# Patient Record
Sex: FEMALE | NOT HISPANIC OR LATINO | URBAN - METROPOLITAN AREA
[De-identification: names, ages, dates, MRNs, and addresses within clinical notes are randomized per-mention and may not be internally consistent; named-entity substitution may affect disease eponyms.]

---

## 2018-05-10 ENCOUNTER — APPOINTMENT (RX ONLY)
Dept: URBAN - METROPOLITAN AREA OTHER 10 | Facility: OTHER | Age: 48
Setting detail: DERMATOLOGY
End: 2018-05-10

## 2018-05-10 DIAGNOSIS — L71.0 PERIORAL DERMATITIS: ICD-10-CM

## 2018-05-10 PROBLEM — I10 ESSENTIAL (PRIMARY) HYPERTENSION: Status: ACTIVE | Noted: 2018-05-10

## 2018-05-10 PROCEDURE — ? PRESCRIPTION

## 2018-05-10 PROCEDURE — 99202 OFFICE O/P NEW SF 15 MIN: CPT

## 2018-05-10 PROCEDURE — ? TREATMENT REGIMEN

## 2018-05-10 PROCEDURE — ? COUNSELING

## 2018-05-10 RX ORDER — DOXYCYCLINE HYCLATE 100 MG/1
CAPSULE, GELATIN COATED ORAL
Qty: 60 | Refills: 0 | Status: ERX | COMMUNITY
Start: 2018-05-10

## 2018-05-10 RX ORDER — PIMECROLIMUS 10 MG/G
CREAM TOPICAL BID
Qty: 100 | Refills: 0 | Status: ERX | COMMUNITY
Start: 2018-05-10

## 2018-05-10 RX ADMIN — DOXYCYCLINE HYCLATE: 100 CAPSULE, GELATIN COATED ORAL at 00:00

## 2018-05-10 RX ADMIN — PIMECROLIMUS: 10 CREAM TOPICAL at 00:00

## 2018-05-10 NOTE — PROCEDURE: TREATMENT REGIMEN
Detail Level: Zone
Initiate Treatment: Doxycycline twice daily x 1 month.  Elidel twice daily
Samples Given: Clindagel to use once daily

## 2018-05-10 NOTE — HPI: RASH
How Severe Is Your Rash?: severe
Is This A New Presentation, Or A Follow-Up?: Rash
Additional History: \\nNew patient

## 2018-06-12 ENCOUNTER — APPOINTMENT (RX ONLY)
Dept: URBAN - METROPOLITAN AREA OTHER 10 | Facility: OTHER | Age: 48
Setting detail: DERMATOLOGY
End: 2018-06-12

## 2018-06-12 DIAGNOSIS — D22 MELANOCYTIC NEVI: ICD-10-CM

## 2018-06-12 DIAGNOSIS — L71.0 PERIORAL DERMATITIS: ICD-10-CM

## 2018-06-12 DIAGNOSIS — D18.0 HEMANGIOMA: ICD-10-CM

## 2018-06-12 DIAGNOSIS — L81.4 OTHER MELANIN HYPERPIGMENTATION: ICD-10-CM

## 2018-06-12 DIAGNOSIS — L82.1 OTHER SEBORRHEIC KERATOSIS: ICD-10-CM

## 2018-06-12 PROBLEM — D23.61 OTHER BENIGN NEOPLASM OF SKIN OF RIGHT UPPER LIMB, INCLUDING SHOULDER: Status: ACTIVE | Noted: 2018-06-12

## 2018-06-12 PROBLEM — D18.01 HEMANGIOMA OF SKIN AND SUBCUTANEOUS TISSUE: Status: ACTIVE | Noted: 2018-06-12

## 2018-06-12 PROBLEM — D23.5 OTHER BENIGN NEOPLASM OF SKIN OF TRUNK: Status: ACTIVE | Noted: 2018-06-12

## 2018-06-12 PROBLEM — D23.72 OTHER BENIGN NEOPLASM OF SKIN OF LEFT LOWER LIMB, INCLUDING HIP: Status: ACTIVE | Noted: 2018-06-12

## 2018-06-12 PROBLEM — D22.5 MELANOCYTIC NEVI OF TRUNK: Status: ACTIVE | Noted: 2018-06-12

## 2018-06-12 PROCEDURE — ? COUNSELING

## 2018-06-12 PROCEDURE — ? TREATMENT REGIMEN

## 2018-06-12 PROCEDURE — 99214 OFFICE O/P EST MOD 30 MIN: CPT

## 2018-06-12 ASSESSMENT — LOCATION DETAILED DESCRIPTION DERM
LOCATION DETAILED: LEFT RIB CAGE
LOCATION DETAILED: LEFT INFERIOR UPPER BACK
LOCATION DETAILED: LEFT MID-UPPER BACK

## 2018-06-12 ASSESSMENT — LOCATION ZONE DERM: LOCATION ZONE: TRUNK

## 2018-06-12 ASSESSMENT — LOCATION SIMPLE DESCRIPTION DERM
LOCATION SIMPLE: LEFT UPPER BACK
LOCATION SIMPLE: ABDOMEN

## 2020-06-17 ENCOUNTER — OFFICE VISIT (OUTPATIENT)
Dept: URBAN - METROPOLITAN AREA CLINIC 74 | Facility: CLINIC | Age: 50
End: 2020-06-17

## 2021-03-30 ENCOUNTER — TELEPHONE ENCOUNTER (OUTPATIENT)
Dept: URBAN - METROPOLITAN AREA CLINIC 23 | Facility: CLINIC | Age: 51
End: 2021-03-30

## 2023-04-03 ENCOUNTER — APPOINTMENT (RX ONLY)
Dept: URBAN - METROPOLITAN AREA OTHER 10 | Facility: OTHER | Age: 53
Setting detail: DERMATOLOGY
End: 2023-04-03

## 2023-04-03 DIAGNOSIS — L82.1 OTHER SEBORRHEIC KERATOSIS: ICD-10-CM

## 2023-04-03 DIAGNOSIS — D485 NEOPLASM OF UNCERTAIN BEHAVIOR OF SKIN: ICD-10-CM

## 2023-04-03 DIAGNOSIS — L81.4 OTHER MELANIN HYPERPIGMENTATION: ICD-10-CM

## 2023-04-03 DIAGNOSIS — L57.0 ACTINIC KERATOSIS: ICD-10-CM

## 2023-04-03 DIAGNOSIS — D18.0 HEMANGIOMA: ICD-10-CM

## 2023-04-03 DIAGNOSIS — D22 MELANOCYTIC NEVI: ICD-10-CM

## 2023-04-03 PROBLEM — D22.71 MELANOCYTIC NEVI OF RIGHT LOWER LIMB, INCLUDING HIP: Status: ACTIVE | Noted: 2023-04-03

## 2023-04-03 PROBLEM — D18.01 HEMANGIOMA OF SKIN AND SUBCUTANEOUS TISSUE: Status: ACTIVE | Noted: 2023-04-03

## 2023-04-03 PROBLEM — D22.62 MELANOCYTIC NEVI OF LEFT UPPER LIMB, INCLUDING SHOULDER: Status: ACTIVE | Noted: 2023-04-03

## 2023-04-03 PROBLEM — D22.72 MELANOCYTIC NEVI OF LEFT LOWER LIMB, INCLUDING HIP: Status: ACTIVE | Noted: 2023-04-03

## 2023-04-03 PROBLEM — D22.61 MELANOCYTIC NEVI OF RIGHT UPPER LIMB, INCLUDING SHOULDER: Status: ACTIVE | Noted: 2023-04-03

## 2023-04-03 PROBLEM — D48.5 NEOPLASM OF UNCERTAIN BEHAVIOR OF SKIN: Status: ACTIVE | Noted: 2023-04-03

## 2023-04-03 PROBLEM — D22.5 MELANOCYTIC NEVI OF TRUNK: Status: ACTIVE | Noted: 2023-04-03

## 2023-04-03 PROBLEM — D23.72 OTHER BENIGN NEOPLASM OF SKIN OF LEFT LOWER LIMB, INCLUDING HIP: Status: ACTIVE | Noted: 2023-04-03

## 2023-04-03 PROCEDURE — ? LIQUID NITROGEN

## 2023-04-03 PROCEDURE — 99213 OFFICE O/P EST LOW 20 MIN: CPT | Mod: 25

## 2023-04-03 PROCEDURE — ? COUNSELING

## 2023-04-03 PROCEDURE — 17000 DESTRUCT PREMALG LESION: CPT | Mod: 59

## 2023-04-03 PROCEDURE — 11102 TANGNTL BX SKIN SINGLE LES: CPT

## 2023-04-03 PROCEDURE — ? SUNSCREEN RECOMMENDATIONS

## 2023-04-03 PROCEDURE — ? BIOPSY BY SHAVE METHOD

## 2023-04-03 ASSESSMENT — LOCATION ZONE DERM
LOCATION ZONE: FACE
LOCATION ZONE: ARM
LOCATION ZONE: LEG
LOCATION ZONE: TRUNK

## 2023-04-03 ASSESSMENT — LOCATION DETAILED DESCRIPTION DERM
LOCATION DETAILED: LEFT VENTRAL PROXIMAL FOREARM
LOCATION DETAILED: RIGHT ANTERIOR DISTAL THIGH
LOCATION DETAILED: RIGHT CENTRAL MALAR CHEEK
LOCATION DETAILED: LEFT PROXIMAL POSTERIOR THIGH
LOCATION DETAILED: LEFT ANTERIOR PROXIMAL UPPER ARM
LOCATION DETAILED: LEFT DISTAL DORSAL FOREARM
LOCATION DETAILED: LEFT INFERIOR CENTRAL MALAR CHEEK
LOCATION DETAILED: LEFT DISTAL POSTERIOR THIGH
LOCATION DETAILED: RIGHT DISTAL POSTERIOR THIGH
LOCATION DETAILED: RIGHT PROXIMAL DORSAL FOREARM
LOCATION DETAILED: RIGHT VENTRAL DISTAL FOREARM
LOCATION DETAILED: RIGHT INFERIOR UPPER BACK
LOCATION DETAILED: LEFT ANTERIOR PROXIMAL THIGH
LOCATION DETAILED: EPIGASTRIC SKIN
LOCATION DETAILED: RIGHT VENTRAL PROXIMAL FOREARM
LOCATION DETAILED: SUPERIOR LUMBAR SPINE

## 2023-04-03 ASSESSMENT — LOCATION SIMPLE DESCRIPTION DERM
LOCATION SIMPLE: LEFT UPPER ARM
LOCATION SIMPLE: LEFT POSTERIOR THIGH
LOCATION SIMPLE: ABDOMEN
LOCATION SIMPLE: LEFT CHEEK
LOCATION SIMPLE: RIGHT POSTERIOR THIGH
LOCATION SIMPLE: LEFT THIGH
LOCATION SIMPLE: RIGHT UPPER BACK
LOCATION SIMPLE: RIGHT THIGH
LOCATION SIMPLE: RIGHT FOREARM
LOCATION SIMPLE: LEFT FOREARM
LOCATION SIMPLE: LOWER BACK
LOCATION SIMPLE: RIGHT CHEEK

## 2023-04-03 NOTE — PROCEDURE: LIQUID NITROGEN
Detail Level: Detailed
Render Post-Care Instructions In Note?: no
Show Aperture Variable?: Yes
Consent: The patient's consent was obtained including but not limited to risks of redness, swelling, blistering, crusting, scabbing, scarring, darker or lighter pigment change, incomplete removal, recurrence, and infection.
Post-Care Instructions: Patient instructed to keep the area clean with soap and water or Hibiclens.  Avoid picking at any of the treated lesions. Pt may soak any crusted or scabbed areas with hydrogen peroxide and cover with Vaseline and a bandage if they would like.
Number Of Freeze-Thaw Cycles: 1 freeze-thaw cycle
Duration Of Freeze Thaw-Cycle (Seconds): 5

## 2023-04-03 NOTE — PROCEDURE: BIOPSY BY SHAVE METHOD
Detail Level: Detailed
Depth Of Biopsy: dermis
Was A Bandage Applied: Yes
Size Of Lesion In Cm: 0
Biopsy Type: H and E
Biopsy Method: Dermablade
Anesthesia Type: 1% lidocaine with epinephrine
Anesthesia Volume In Cc: 0.3
Hemostasis: Aluminum Chloride and Electrocautery
Wound Care: Vaseline
Dressing: Band-Aid
Destruction After The Procedure: No
Type Of Destruction Used: Electrodesiccation
Cryotherapy Text: The wound bed was treated with cryotherapy after the biopsy was performed.
Electrodesiccation Text: The wound bed was treated with electrodesiccation after the biopsy was performed.
Electrodesiccation And Curettage Text: The wound bed was treated with electrodesiccation and curettage after the biopsy was performed.
Silver Nitrate Text: The wound bed was treated with silver nitrate after the biopsy was performed.
Lab: [id number]
Lab Facility: [id number]
Consent: The risks and benefits of therapy were discussed, including incomplete removal, recurrence, bleeding, infection, scarring, discomfort, and prolonged wound healing.
Post-Care Instructions: I reviewed post-care instructions with the patient. Patient is to leave our bandage on overnight. Tomorrow they may clean the area gently with soap and water or Hibiclens, then apply Vaseline ointment once or twice daily, changing out the bandage daily until the skin has completely healed over. Patient may apply hydrogen peroxide soaks to remove any crusting. Advised patient that keeping the area clean, moist and covered will minimize the risk of infection, crusting, scabbing and scarring. Minimal redness around the area is normal. Instructed to contact the office immediately should they develop significant bleeding, redness, tenderness, purulent drainage, fevers, or chills.
Notification Instructions: Patient will be notified of biopsy results. However, patient instructed to call the office if not contacted within 2 weeks.
Billing Type: Third-Party Bill
Information: Selecting Yes will display possible errors in your note based on the variables you have selected. This validation is only offered as a suggestion for you. PLEASE NOTE THAT THE VALIDATION TEXT WILL BE REMOVED WHEN YOU FINALIZE YOUR NOTE. IF YOU WANT TO FAX A PRELIMINARY NOTE YOU WILL NEED TO TOGGLE THIS TO 'NO' IF YOU DO NOT WANT IT IN YOUR FAXED NOTE.

## 2023-10-03 ENCOUNTER — APPOINTMENT (RX ONLY)
Dept: URBAN - METROPOLITAN AREA OTHER 10 | Facility: OTHER | Age: 53
Setting detail: DERMATOLOGY
End: 2023-10-03

## 2023-10-03 DIAGNOSIS — L90.5 SCAR CONDITIONS AND FIBROSIS OF SKIN: ICD-10-CM

## 2023-10-03 PROCEDURE — 99212 OFFICE O/P EST SF 10 MIN: CPT

## 2023-10-03 PROCEDURE — ? COUNSELING

## 2023-10-03 ASSESSMENT — LOCATION ZONE DERM: LOCATION ZONE: FACE

## 2023-10-03 ASSESSMENT — LOCATION DETAILED DESCRIPTION DERM: LOCATION DETAILED: LEFT LATERAL BUCCAL CHEEK

## 2023-10-03 ASSESSMENT — LOCATION SIMPLE DESCRIPTION DERM: LOCATION SIMPLE: LEFT CHEEK

## 2024-07-26 ENCOUNTER — DASHBOARD ENCOUNTERS (OUTPATIENT)
Age: 54
End: 2024-07-26

## 2024-07-29 ENCOUNTER — OFFICE VISIT (OUTPATIENT)
Dept: URBAN - METROPOLITAN AREA CLINIC 74 | Facility: CLINIC | Age: 54
End: 2024-07-29

## 2024-07-31 ENCOUNTER — OFFICE VISIT (OUTPATIENT)
Dept: URBAN - METROPOLITAN AREA CLINIC 74 | Facility: CLINIC | Age: 54
End: 2024-07-31
Payer: COMMERCIAL

## 2024-07-31 ENCOUNTER — LAB OUTSIDE AN ENCOUNTER (OUTPATIENT)
Dept: URBAN - METROPOLITAN AREA CLINIC 74 | Facility: CLINIC | Age: 54
End: 2024-07-31

## 2024-07-31 VITALS
DIASTOLIC BLOOD PRESSURE: 60 MMHG | WEIGHT: 126 LBS | SYSTOLIC BLOOD PRESSURE: 110 MMHG | BODY MASS INDEX: 23.19 KG/M2 | HEART RATE: 62 BPM | HEIGHT: 62 IN | OXYGEN SATURATION: 97 % | TEMPERATURE: 98.4 F

## 2024-07-31 DIAGNOSIS — K57.30 SIGMOID DIVERTICULOSIS: ICD-10-CM

## 2024-07-31 DIAGNOSIS — Z98.84 S/P GASTRIC BYPASS: ICD-10-CM

## 2024-07-31 DIAGNOSIS — K76.0 FATTY LIVER: ICD-10-CM

## 2024-07-31 DIAGNOSIS — K31.9 GASTROPATHY: ICD-10-CM

## 2024-07-31 DIAGNOSIS — R74.01 ELEVATED TRANSAMINASE LEVEL: ICD-10-CM

## 2024-07-31 DIAGNOSIS — K44.9 HIATAL HERNIA: ICD-10-CM

## 2024-07-31 DIAGNOSIS — Z86.39 HISTORY OF MORBID OBESITY: ICD-10-CM

## 2024-07-31 DIAGNOSIS — K62.1 HYPERPLASTIC RECTAL POLYP: ICD-10-CM

## 2024-07-31 DIAGNOSIS — K21.0 GASTROESOPHAGEAL REFLUX DISEASE WITH ESOPHAGITIS: ICD-10-CM

## 2024-07-31 DIAGNOSIS — K82.4 GALLBLADDER POLYP: ICD-10-CM

## 2024-07-31 PROBLEM — 161453001: Status: ACTIVE | Noted: 2024-07-31

## 2024-07-31 PROBLEM — 698450007: Status: ACTIVE | Noted: 2024-07-31

## 2024-07-31 PROBLEM — 365767001: Status: ACTIVE | Noted: 2024-07-31

## 2024-07-31 PROBLEM — 197433003: Status: ACTIVE | Noted: 2024-07-31

## 2024-07-31 PROCEDURE — 99214 OFFICE O/P EST MOD 30 MIN: CPT | Performed by: INTERNAL MEDICINE

## 2024-07-31 RX ORDER — SIMVASTATIN 40 MG/1
TAKE 1 TABLET BY MOUTH EVERYDAY AT BEDTIME TABLET, FILM COATED ORAL
Qty: 90 EACH | Refills: 0 | Status: ACTIVE | COMMUNITY

## 2024-07-31 RX ORDER — BISOPROLOL FUMARATE 5 MG/1
TABLET, FILM COATED ORAL
Qty: 90 TABLET | Status: ACTIVE | COMMUNITY

## 2024-07-31 RX ORDER — FLUOXETINE HYDROCHLORIDE 40 MG/1
TAKE 1 CAPSULE BY MOUTH EVERY DAY FOR 90 DAYS CAPSULE ORAL
Qty: 90 EACH | Refills: 0 | Status: ACTIVE | COMMUNITY

## 2024-07-31 RX ORDER — LINACLOTIDE 72 UG/1
CAPSULE, GELATIN COATED ORAL
Qty: 30 CAPSULE | Status: ACTIVE | COMMUNITY

## 2024-07-31 RX ORDER — MONTELUKAST SODIUM 10 MG/1
TAKE 1 TABLET BY MOUTH EVERY DAY IN THE EVENING FOR 90 DAYS TABLET, FILM COATED ORAL
Qty: 90 EACH | Refills: 0 | Status: ACTIVE | COMMUNITY

## 2024-07-31 RX ORDER — ESZOPICLONE 2 MG/1
TAKE 1 TABLET BY MOUTH EVERY DAY IMMEDIATELY BEFORE BEDTIME TABLET, FILM COATED ORAL
Qty: 90 EACH | Refills: 0 | Status: ACTIVE | COMMUNITY

## 2024-07-31 RX ORDER — TRAZODONE HYDROCHLORIDE 100 MG/1
TAKE 1 TABLET BY MOUTH EVERY NIGHT AT BEDTIME TABLET ORAL
Qty: 90 EACH | Refills: 0 | Status: ACTIVE | COMMUNITY

## 2024-07-31 NOTE — HPI-TODAY'S VISIT:
Today July 31, 2024 the patient returns for a visit, the patient was last seen on December 16, 2019 with hyperplastic rectal polyps, sigmoid diverticulosis and internal hemorrhoids, a hiatal hernia, gastroesophageal reflux with esophagitis, gastropathy, morbid obesity and fatty liver.  At the time of the visit the patient complained of cough due to allergies caused by the dog, denied having any heartburn, there was no reflux, the patient was taking Protonix 40 mg twice daily following antireflux measures and diet.  The patient's previous EGD had shown reflux related esophageal changes, small hiatal hernia and H. pylori negative gastropathy, fundic gland polyposis.  An ultrasound has shown fatty liver and a normal gallbladder.  The patient's previous colonoscopy performed on August 19, 2019 revealed colonic diverticulosis and a benign sessile hyperplastic rectal polyp.  Today the patient returns to the office stating that she did have a gastric bypass, Romi-en-Y about a year ago and ever since she has lost almost 100 pounds.  The patient currently denies having any upper GI symptoms such as dysphagia, odynophagia, nausea, vomiting or heartburn, the patient is having type IV stools on the [place] scale with no evidence of any bleeding.  The patient had a laboratory drawn on January 23, 2024 and at the time the patient had an AST of 21, ALT of 13 total bilirubin 0.5 alkaline phosphatase 98, albumin 4.2 with globulins of 3.0.  The patient's renal function was normal and the glucose was 137.  The patient has a normal CBC.  Subsequently in April 2024 laboratory obtained by primary care revealed an elevation of the AST to 54 with an ALT of 102, alk phos 86 total bilirubin 0.4 albumin 4.1 globulin stroke to 1.7.  The patient has a follow-up on lab on June 9, 2024 where the AST increased to 103,  alk phos 90 with a bilirubin of 0.9 albumin 3.9 with low globulin of 1.7.  The patient has a normal CBC except for platelets of 125,000.  An ultrasound revealed a normal liver normal biliary ducts but the gallbladder revealed a 1 mm polyp, there were no large stones.  The common bile duct measured 4 mm.  Iron studies and ferritin were normal.The patient's vitamin B12 level and folic acid were normal  Currently the patient claims having intermittent cramping abdominal pain after meals but mostly slight discomfort over the right upper quadrant with no tenderness.  There has been no jaundice fever nausea or vomiting.  The patient has been recommended to hold statins, currently her lipid panel is entirely normal, she is taking an over-the-counter vitamin which contains chromium and green tea among other products which she is to hold.  In the meantime the patient is to get a HIDA CCK, we will obtain an JL, anti-smooth muscle antibodies, hepatitis panel, AMA, ceruloplasmin, alpha-1 antitrypsin.  The patient will get a repeat hepatic function panel with fib 4  30 days after she stops statins and vitamin supplements.  We  may also get a liver MRI looking for accelerated fatty liver.   She will return for a follow-up visit after completion of the evaluation.

## 2024-08-12 ENCOUNTER — TELEPHONE ENCOUNTER (OUTPATIENT)
Dept: URBAN - METROPOLITAN AREA CLINIC 74 | Facility: CLINIC | Age: 54
End: 2024-08-12

## 2024-09-04 PROBLEM — 197432008: Status: ACTIVE | Noted: 2024-09-04

## 2024-09-09 ENCOUNTER — OFFICE VISIT (OUTPATIENT)
Dept: URBAN - METROPOLITAN AREA CLINIC 74 | Facility: CLINIC | Age: 54
End: 2024-09-09
Payer: COMMERCIAL

## 2024-09-09 VITALS
SYSTOLIC BLOOD PRESSURE: 120 MMHG | WEIGHT: 123 LBS | HEIGHT: 62 IN | DIASTOLIC BLOOD PRESSURE: 70 MMHG | BODY MASS INDEX: 22.63 KG/M2 | HEART RATE: 58 BPM | OXYGEN SATURATION: 97 %

## 2024-09-09 DIAGNOSIS — K76.0 FATTY LIVER: ICD-10-CM

## 2024-09-09 DIAGNOSIS — K57.30 SIGMOID DIVERTICULOSIS: ICD-10-CM

## 2024-09-09 DIAGNOSIS — Z86.010 COLON POLYP HISTORY: ICD-10-CM

## 2024-09-09 PROCEDURE — 99213 OFFICE O/P EST LOW 20 MIN: CPT | Performed by: INTERNAL MEDICINE

## 2024-09-09 RX ORDER — ESZOPICLONE 2 MG/1
TAKE 1 TABLET BY MOUTH EVERY DAY IMMEDIATELY BEFORE BEDTIME TABLET, FILM COATED ORAL
Qty: 90 EACH | Refills: 0 | Status: ACTIVE | COMMUNITY

## 2024-09-09 RX ORDER — MONTELUKAST SODIUM 10 MG/1
TAKE 1 TABLET BY MOUTH EVERY DAY IN THE EVENING FOR 90 DAYS TABLET, FILM COATED ORAL
Qty: 90 EACH | Refills: 0 | Status: ACTIVE | COMMUNITY

## 2024-09-09 RX ORDER — BISOPROLOL FUMARATE 5 MG/1
TABLET, FILM COATED ORAL
Qty: 90 TABLET | Status: ACTIVE | COMMUNITY

## 2024-09-09 RX ORDER — FLUOXETINE HYDROCHLORIDE 40 MG/1
TAKE 1 CAPSULE BY MOUTH EVERY DAY FOR 90 DAYS CAPSULE ORAL
Qty: 90 EACH | Refills: 0 | Status: ACTIVE | COMMUNITY

## 2024-09-09 RX ORDER — LINACLOTIDE 72 UG/1
CAPSULE, GELATIN COATED ORAL
Qty: 30 CAPSULE | Status: ACTIVE | COMMUNITY

## 2024-09-09 RX ORDER — SIMVASTATIN 40 MG/1
TAKE 1 TABLET BY MOUTH EVERYDAY AT BEDTIME TABLET, FILM COATED ORAL
Qty: 90 EACH | Refills: 0 | Status: ACTIVE | COMMUNITY

## 2024-09-09 RX ORDER — TRAZODONE HYDROCHLORIDE 100 MG/1
TAKE 1 TABLET BY MOUTH EVERY NIGHT AT BEDTIME TABLET ORAL
Qty: 90 EACH | Refills: 0 | Status: ACTIVE | COMMUNITY

## 2024-09-09 NOTE — HPI-TODAY'S VISIT:
Today July 31, 2024 the patient returns for a visit, the patient was last seen on December 16, 2019 with hyperplastic rectal polyps, sigmoid diverticulosis and internal hemorrhoids, a hiatal hernia, gastroesophageal reflux with esophagitis, gastropathy, morbid obesity and fatty liver.  At the time of the visit the patient complained of cough due to allergies caused by the dog, denied having any heartburn, there was no reflux, the patient was taking Protonix 40 mg twice daily following antireflux measures and diet.  The patient's previous EGD had shown reflux related esophageal changes, small hiatal hernia and H. pylori negative gastropathy, fundic gland polyposis.  An ultrasound has shown fatty liver and a normal gallbladder.  The patient's previous colonoscopy performed on August 19, 2019 revealed colonic diverticulosis and a benign sessile hyperplastic rectal polyp.  Today the patient returns to the office stating that she did have a gastric bypass, Romi-en-Y about a year ago and ever since she has lost almost 100 pounds.  The patient currently denies having any upper GI symptoms such as dysphagia, odynophagia, nausea, vomiting or heartburn, the patient is having type IV stools on the [place] scale with no evidence of any bleeding.  The patient had a laboratory drawn on January 23, 2024 and at the time the patient had an AST of 21, ALT of 13 total bilirubin 0.5 alkaline phosphatase 98, albumin 4.2 with globulins of 3.0.  The patient's renal function was normal and the glucose was 137.  The patient has a normal CBC.  Subsequently in April 2024 laboratory obtained by primary care revealed an elevation of the AST to 54 with an ALT of 102, alk phos 86 total bilirubin 0.4 albumin 4.1 globulin stroke to 1.7.  The patient has a follow-up on lab on June 9, 2024 where the AST increased to 103,  alk phos 90 with a bilirubin of 0.9 albumin 3.9 with low globulin of 1.7.  The patient has a normal CBC except for platelets of 125,000.  An ultrasound revealed a normal liver normal biliary ducts but the gallbladder revealed a 1 mm polyp, there were no large stones.  The common bile duct measured 4 mm.  Iron studies and ferritin were normal.The patient's vitamin B12 level and folic acid were normal  Currently the patient claims having intermittent cramping abdominal pain after meals but mostly slight discomfort over the right upper quadrant with no tenderness.  There has been no jaundice fever nausea or vomiting.  The patient has been recommended to hold statins, currently her lipid panel is entirely normal, she is taking an over-the-counter vitamin which contains chromium and green tea among other products which she is to hold.  In the meantime the patient is to get a HIDA CCK, we will obtain an JL, anti-smooth muscle antibodies, hepatitis panel, AMA, ceruloplasmin, alpha-1 antitrypsin.  The patient will get a repeat hepatic function panel with fib 4  30 days after she stops statins and vitamin supplements.  We  may also get a liver MRI looking for accelerated fatty liver.   She will return for a follow-up visit after completion of the evaluation.  Today September 9, 2024 the patient returns for a follow-up visit, the patient was last seen on July 31, 2020 for with gastroesophageal reflux with esophagitis, hiatal hernia, gastropathy, sigmoid diverticulosis and hyperplastic rectal polyps, history of gallbladder polyp, fatty liver and elevated transaminases, history of morbid obesity and previous gastric bypass.  At the time of the visit the patient complained of cramping abdominal pain, it occurred mostly after meals, pain was located mostly in the right upper quadrant with no tenderness no jaundice fever, nausea or vomiting.  Laboratory drawn January 23, 2024 revealed an AST of 21, ALT 13 bilirubin 0.5, alkaline phosphatase 98, albumin 12.2 with globulins of 3.0, glucose 137, normal CBC.  April 2024 the patient had elevation of the AST to 54 with an ALT of 402 alkaline phosphatase of 86 bilirubin 0.4 normal albumin and globulins.  In June 2024 AST increased 203,  alkaline phosphatase 90 with bilirubin of 0.9 normal albumin and globulins.  The patient CBC was normal except 425,000 platelets.  An ultrasound revealed a normal liver, normal biliary ducts, the patient had a 1 mm gallbladder polyp and no large stones, the common bile duct measured 4 mm.  At the time we will order the HIDA CCK, we advised the patient to hold statins, at the time her lipid panel was normal the patient was taking over-the-counter vitamins and green tea and was advised to hold it we also ordered a JL, anti-smooth muscle antibodies hepatitis panel AMA ceruloplasmin alpha-1 antitrypsin and we were to get a hepatic function panel with it for 30 days after she stopped the previously mentioned meds we discussed obtaining an MRI of the liver.  Laboratory on July 31, 2024 revealed a normal ceruloplasmin, normal alpha-1 antitrypsin, normal anti-smooth muscle antibodies, normal AMA and JL and a negative hepatitis panel the HIDA CCK revealed a low ejection frac  Today the patient returns to the office stating that she is doing well, the patient has some abdominal discomfort due to increased bloating and some flatulence, it is limited and not frequent, improved by passing gas.  Denies having any intense abdominal pain, nausea, vomiting.  It might be related to the previous bypass surgery and increased intake of fiber.  I discussed with the patient the recent HIDA CCK which revealed a low ejection fraction, the patient did visit with her bariatric surgeon who agreed to perform a cholecystectomy.  The patient has been advised to get a hepatic function panel with fib 4 index, recommendations will follow.  The patient is not due to have a colonoscopy till 2029.  We will contact the patient with results and recommendations.tion of 28.5% with a normal of 35% suggestive of either chronic cholecystitis or biliary dyskinesia

## 2024-09-17 LAB
ALBUMIN/GLOBULIN RATIO: 1.9
ALBUMIN: 4
ALKALINE PHOSPHATASE: 76
ALT: 54
AST: 39
BILIRUBIN, DIRECT: 0.1
BILIRUBIN, INDIRECT: 0.5
BILIRUBIN, TOTAL: 0.6
ENHANCED LIVER FIBROSIS (ELF) SCORE: 9.39
FIB 4 INDEX: 1.62
GLOBULIN: 2.1
PLATELET COUNT: 180
PROTEIN, TOTAL: 6.1